# Patient Record
Sex: MALE | Race: BLACK OR AFRICAN AMERICAN | Employment: UNEMPLOYED
[De-identification: names, ages, dates, MRNs, and addresses within clinical notes are randomized per-mention and may not be internally consistent; named-entity substitution may affect disease eponyms.]

---

## 2024-04-17 ENCOUNTER — APPOINTMENT (OUTPATIENT)
Facility: HOSPITAL | Age: 49
End: 2024-04-17
Payer: COMMERCIAL

## 2024-04-17 ENCOUNTER — HOSPITAL ENCOUNTER (EMERGENCY)
Facility: HOSPITAL | Age: 49
Discharge: HOME | End: 2024-04-17
Attending: STUDENT IN AN ORGANIZED HEALTH CARE EDUCATION/TRAINING PROGRAM
Payer: COMMERCIAL

## 2024-04-17 VITALS
SYSTOLIC BLOOD PRESSURE: 142 MMHG | BODY MASS INDEX: 41.47 KG/M2 | DIASTOLIC BLOOD PRESSURE: 58 MMHG | OXYGEN SATURATION: 100 % | HEIGHT: 69 IN | RESPIRATION RATE: 13 BRPM | WEIGHT: 280 LBS | HEART RATE: 73 BPM | TEMPERATURE: 98 F

## 2024-04-17 DIAGNOSIS — R07.9 CHEST PAIN, UNSPECIFIED TYPE: Primary | ICD-10-CM

## 2024-04-17 LAB
ALBUMIN SERPL-MCNC: 4.2 G/DL (ref 3.5–5)
ALBUMIN/GLOB SERPL: 1.1 (ref 1.1–2.2)
ALP SERPL-CCNC: 95 U/L (ref 45–117)
ALT SERPL-CCNC: 23 U/L (ref 12–78)
ANION GAP SERPL CALC-SCNC: 8 MMOL/L (ref 5–15)
AST SERPL W P-5'-P-CCNC: 47 U/L (ref 15–37)
BASOPHILS # BLD: 0 K/UL (ref 0–0.1)
BASOPHILS NFR BLD: 0 % (ref 0–1)
BILIRUB SERPL-MCNC: 0.9 MG/DL (ref 0.2–1)
BNP SERPL-MCNC: 17 PG/ML
BUN SERPL-MCNC: 17 MG/DL (ref 6–20)
BUN/CREAT SERPL: 22 (ref 12–20)
CA-I BLD-MCNC: 10.2 MG/DL (ref 8.5–10.1)
CHLORIDE SERPL-SCNC: 107 MMOL/L (ref 97–108)
CO2 SERPL-SCNC: 22 MMOL/L (ref 21–32)
CREAT SERPL-MCNC: 0.76 MG/DL (ref 0.7–1.3)
DIFFERENTIAL METHOD BLD: ABNORMAL
EOSINOPHIL # BLD: 0.1 K/UL (ref 0–0.4)
EOSINOPHIL NFR BLD: 0 % (ref 0–7)
ERYTHROCYTE [DISTWIDTH] IN BLOOD BY AUTOMATED COUNT: 14.9 % (ref 11.5–14.5)
GLOBULIN SER CALC-MCNC: 3.8 G/DL (ref 2–4)
GLUCOSE SERPL-MCNC: 118 MG/DL (ref 65–100)
HCT VFR BLD AUTO: 43 % (ref 36.6–50.3)
HGB BLD-MCNC: 14.1 G/DL (ref 12.1–17)
IMM GRANULOCYTES # BLD AUTO: 0 K/UL (ref 0–0.04)
IMM GRANULOCYTES NFR BLD AUTO: 0 % (ref 0–0.5)
LYMPHOCYTES # BLD: 1.7 K/UL (ref 0.8–3.5)
LYMPHOCYTES NFR BLD: 14 % (ref 12–49)
MCH RBC QN AUTO: 26.9 PG (ref 26–34)
MCHC RBC AUTO-ENTMCNC: 32.8 G/DL (ref 30–36.5)
MCV RBC AUTO: 81.9 FL (ref 80–99)
MONOCYTES # BLD: 0.7 K/UL (ref 0–1)
MONOCYTES NFR BLD: 6 % (ref 5–13)
NEUTS SEG # BLD: 9.5 K/UL (ref 1.8–8)
NEUTS SEG NFR BLD: 80 % (ref 32–75)
NRBC # BLD: 0 K/UL (ref 0–0.01)
NRBC BLD-RTO: 0 PER 100 WBC
PLATELET # BLD AUTO: 246 K/UL (ref 150–400)
PMV BLD AUTO: 10.8 FL (ref 8.9–12.9)
POTASSIUM SERPL-SCNC: 4.9 MMOL/L (ref 3.5–5.1)
PROT SERPL-MCNC: 8 G/DL (ref 6.4–8.2)
RBC # BLD AUTO: 5.25 M/UL (ref 4.1–5.7)
SODIUM SERPL-SCNC: 137 MMOL/L (ref 136–145)
TROPONIN I SERPL HS-MCNC: <4 NG/L (ref 0–76)
TROPONIN I SERPL HS-MCNC: <4 NG/L (ref 0–76)
WBC # BLD AUTO: 12 K/UL (ref 4.1–11.1)

## 2024-04-17 PROCEDURE — 84484 ASSAY OF TROPONIN QUANT: CPT

## 2024-04-17 PROCEDURE — 6370000000 HC RX 637 (ALT 250 FOR IP): Performed by: STUDENT IN AN ORGANIZED HEALTH CARE EDUCATION/TRAINING PROGRAM

## 2024-04-17 PROCEDURE — 80053 COMPREHEN METABOLIC PANEL: CPT

## 2024-04-17 PROCEDURE — 6360000002 HC RX W HCPCS: Performed by: STUDENT IN AN ORGANIZED HEALTH CARE EDUCATION/TRAINING PROGRAM

## 2024-04-17 PROCEDURE — 96374 THER/PROPH/DIAG INJ IV PUSH: CPT

## 2024-04-17 PROCEDURE — 83880 ASSAY OF NATRIURETIC PEPTIDE: CPT

## 2024-04-17 PROCEDURE — 71045 X-RAY EXAM CHEST 1 VIEW: CPT

## 2024-04-17 PROCEDURE — 36415 COLL VENOUS BLD VENIPUNCTURE: CPT

## 2024-04-17 PROCEDURE — 85025 COMPLETE CBC W/AUTO DIFF WBC: CPT

## 2024-04-17 PROCEDURE — 99285 EMERGENCY DEPT VISIT HI MDM: CPT

## 2024-04-17 RX ORDER — ASPIRIN 325 MG
325 TABLET ORAL
Status: COMPLETED | OUTPATIENT
Start: 2024-04-17 | End: 2024-04-17

## 2024-04-17 RX ORDER — KETOROLAC TROMETHAMINE 10 MG/1
10 TABLET, FILM COATED ORAL EVERY 6 HOURS PRN
Qty: 20 TABLET | Refills: 0 | Status: SHIPPED | OUTPATIENT
Start: 2024-04-17

## 2024-04-17 RX ORDER — NITROGLYCERIN 0.4 MG/1
0.4 TABLET SUBLINGUAL
Status: COMPLETED | OUTPATIENT
Start: 2024-04-17 | End: 2024-04-17

## 2024-04-17 RX ORDER — METHOCARBAMOL 750 MG/1
750 TABLET, FILM COATED ORAL EVERY 6 HOURS PRN
Qty: 20 TABLET | Refills: 0 | Status: SHIPPED | OUTPATIENT
Start: 2024-04-17 | End: 2024-04-22

## 2024-04-17 RX ORDER — KETOROLAC TROMETHAMINE 15 MG/ML
15 INJECTION, SOLUTION INTRAMUSCULAR; INTRAVENOUS
Status: COMPLETED | OUTPATIENT
Start: 2024-04-17 | End: 2024-04-17

## 2024-04-17 RX ADMIN — NITROGLYCERIN 0.4 MG: 0.4 TABLET, ORALLY DISINTEGRATING SUBLINGUAL at 05:26

## 2024-04-17 RX ADMIN — KETOROLAC TROMETHAMINE 15 MG: 15 INJECTION, SOLUTION INTRAMUSCULAR; INTRAVENOUS at 05:40

## 2024-04-17 RX ADMIN — ASPIRIN 325 MG: 325 TABLET ORAL at 05:26

## 2024-04-17 ASSESSMENT — PAIN DESCRIPTION - ORIENTATION: ORIENTATION: RIGHT

## 2024-04-17 ASSESSMENT — LIFESTYLE VARIABLES
HOW MANY STANDARD DRINKS CONTAINING ALCOHOL DO YOU HAVE ON A TYPICAL DAY: PATIENT DOES NOT DRINK
HOW OFTEN DO YOU HAVE A DRINK CONTAINING ALCOHOL: NEVER

## 2024-04-17 ASSESSMENT — PAIN SCALES - GENERAL
PAINLEVEL_OUTOF10: 5
PAINLEVEL_OUTOF10: 5
PAINLEVEL_OUTOF10: 2
PAINLEVEL_OUTOF10: 2

## 2024-04-17 ASSESSMENT — PAIN DESCRIPTION - DESCRIPTORS: DESCRIPTORS: CRAMPING

## 2024-04-17 ASSESSMENT — HEART SCORE: ECG: NON-SPECIFC REPOLARIZATION DISTURBANCE/LBTB/PM

## 2024-04-17 ASSESSMENT — PAIN - FUNCTIONAL ASSESSMENT
PAIN_FUNCTIONAL_ASSESSMENT: 0-10

## 2024-04-17 ASSESSMENT — PAIN DESCRIPTION - LOCATION
LOCATION: BACK;CHEST
LOCATION: CHEST
LOCATION: BACK

## 2024-04-17 NOTE — ED NOTES
Discharge instructions and follow up care reviewed with pt and family. Pt and family expressed understanding with all questions answered. Pt and family ambulated out with all belongings and a steady gait.

## 2024-04-17 NOTE — DISCHARGE INSTRUCTIONS
Thank you!  Thank you for allowing me to care for you in the emergency department. It is my goal to provide you with excellent care.  Please fill out the survey that will come to you by mail or email since we listen to your feedback!     Below you will find a list of your tests from today's visit.  Should you have any questions, please do not hesitate to call the emergency department.    Labs  Recent Results (from the past 12 hour(s))   Brain Natriuretic Peptide    Collection Time: 04/17/24  5:17 AM   Result Value Ref Range    NT Pro-BNP 17 <125 pg/mL   Troponin    Collection Time: 04/17/24  5:17 AM   Result Value Ref Range    Troponin, High Sensitivity <4 0 - 76 ng/L   Comprehensive Metabolic Panel    Collection Time: 04/17/24  5:17 AM   Result Value Ref Range    Sodium 137 136 - 145 mmol/L    Potassium 4.9 3.5 - 5.1 mmol/L    Chloride 107 97 - 108 mmol/L    CO2 22 21 - 32 mmol/L    Anion Gap 8 5 - 15 mmol/L    Glucose 118 (H) 65 - 100 mg/dL    BUN 17 6 - 20 mg/dL    Creatinine 0.76 0.70 - 1.30 mg/dL    Bun/Cre Ratio 22 (H) 12 - 20      Est, Glom Filt Rate >90 >60 ml/min/1.73m2    Calcium 10.2 (H) 8.5 - 10.1 mg/dL    Total Bilirubin 0.9 0.2 - 1.0 mg/dL    AST 47 (H) 15 - 37 U/L    ALT 23 12 - 78 U/L    Alk Phosphatase 95 45 - 117 U/L    Total Protein 8.0 6.4 - 8.2 g/dL    Albumin 4.2 3.5 - 5.0 g/dL    Globulin 3.8 2.0 - 4.0 g/dL    Albumin/Globulin Ratio 1.1 1.1 - 2.2     CBC with Auto Differential    Collection Time: 04/17/24  5:17 AM   Result Value Ref Range    WBC 12.0 (H) 4.1 - 11.1 K/uL    RBC 5.25 4.10 - 5.70 M/uL    Hemoglobin 14.1 12.1 - 17.0 g/dL    Hematocrit 43.0 36.6 - 50.3 %    MCV 81.9 80.0 - 99.0 FL    MCH 26.9 26.0 - 34.0 PG    MCHC 32.8 30.0 - 36.5 g/dL    RDW 14.9 (H) 11.5 - 14.5 %    Platelets 246 150 - 400 K/uL    MPV 10.8 8.9 - 12.9 FL    Nucleated RBCs 0.0 0.0  WBC    nRBC 0.00 0.00 - 0.01 K/uL    Neutrophils % 80 (H) 32 - 75 %    Lymphocytes % 14 12 - 49 %    Monocytes % 6 5 - 13

## 2024-04-22 LAB
EKG ATRIAL RATE: 73 BPM
EKG DIAGNOSIS: NORMAL
EKG P AXIS: 58 DEGREES
EKG P-R INTERVAL: 142 MS
EKG Q-T INTERVAL: 374 MS
EKG QRS DURATION: 82 MS
EKG QTC CALCULATION (BAZETT): 412 MS
EKG R AXIS: 17 DEGREES
EKG T AXIS: 30 DEGREES
EKG VENTRICULAR RATE: 73 BPM